# Patient Record
Sex: MALE | Race: WHITE | NOT HISPANIC OR LATINO | Employment: OTHER | URBAN - NONMETROPOLITAN AREA
[De-identification: names, ages, dates, MRNs, and addresses within clinical notes are randomized per-mention and may not be internally consistent; named-entity substitution may affect disease eponyms.]

---

## 2023-05-23 LAB
AMPHETAMINE (PRESENCE) IN URINE BY SCREEN METHOD: NORMAL
BARBITURATES PRESENCE IN URINE BY SCREEN METHOD: NORMAL
BENZODIAZEPINE (PRESENCE) IN URINE BY SCREEN METHOD: NORMAL
CANNABINOIDS IN URINE BY SCREEN METHOD: NORMAL
COCAINE (PRESENCE) IN URINE BY SCREEN METHOD: NORMAL
DRUG SCREEN COMMENT URINE: NORMAL
FENTANYL URINE: NORMAL
METHADONE (PRESENCE) IN URINE BY SCREEN METHOD: NORMAL
OPIATES (PRESENCE) IN URINE BY SCREEN METHOD: NORMAL
OXYCODONE (PRESENCE) IN URINE BY SCREEN METHOD: NORMAL
PHENCYCLIDINE (PRESENCE) IN URINE BY SCREEN METHOD: NORMAL

## 2023-11-04 DIAGNOSIS — J30.9 ALLERGIC RHINITIS, UNSPECIFIED: ICD-10-CM

## 2023-11-06 RX ORDER — LORATADINE 10 MG/1
10 TABLET ORAL DAILY
Qty: 90 TABLET | Refills: 1 | Status: SHIPPED | OUTPATIENT
Start: 2023-11-06 | End: 2024-05-30

## 2024-01-15 PROBLEM — J44.9 COPD (CHRONIC OBSTRUCTIVE PULMONARY DISEASE) (MULTI): Status: ACTIVE | Noted: 2024-01-15

## 2024-01-15 PROBLEM — I82.90 VENOUS THROMBOSIS: Status: ACTIVE | Noted: 2024-01-15

## 2024-01-15 RX ORDER — UMECLIDINIUM BROMIDE AND VILANTEROL TRIFENATATE 62.5; 25 UG/1; UG/1
POWDER RESPIRATORY (INHALATION)
COMMUNITY
Start: 2020-12-29 | End: 2024-01-29 | Stop reason: SDUPTHER

## 2024-01-15 RX ORDER — GABAPENTIN 300 MG/1
CAPSULE ORAL
COMMUNITY
Start: 2022-06-01

## 2024-01-15 RX ORDER — MELOXICAM 15 MG/1
1 TABLET ORAL DAILY
COMMUNITY
Start: 2013-01-09

## 2024-01-15 RX ORDER — ALBUTEROL SULFATE 90 UG/1
2 AEROSOL, METERED RESPIRATORY (INHALATION) EVERY 4 HOURS PRN
COMMUNITY
Start: 2020-08-17 | End: 2024-01-29 | Stop reason: SDUPTHER

## 2024-01-29 ENCOUNTER — OFFICE VISIT (OUTPATIENT)
Dept: PULMONOLOGY | Facility: CLINIC | Age: 84
End: 2024-01-29
Payer: MEDICARE

## 2024-01-29 VITALS
BODY MASS INDEX: 35.58 KG/M2 | DIASTOLIC BLOOD PRESSURE: 71 MMHG | HEART RATE: 71 BPM | WEIGHT: 248 LBS | SYSTOLIC BLOOD PRESSURE: 128 MMHG | OXYGEN SATURATION: 95 %

## 2024-01-29 DIAGNOSIS — G47.34 SLEEP RELATED HYPOXIA: ICD-10-CM

## 2024-01-29 DIAGNOSIS — G47.33 OSA (OBSTRUCTIVE SLEEP APNEA): ICD-10-CM

## 2024-01-29 DIAGNOSIS — J43.2 CENTRILOBULAR EMPHYSEMA (MULTI): Primary | ICD-10-CM

## 2024-01-29 PROCEDURE — 1036F TOBACCO NON-USER: CPT | Performed by: NURSE PRACTITIONER

## 2024-01-29 PROCEDURE — 1160F RVW MEDS BY RX/DR IN RCRD: CPT | Performed by: NURSE PRACTITIONER

## 2024-01-29 PROCEDURE — 1125F AMNT PAIN NOTED PAIN PRSNT: CPT | Performed by: NURSE PRACTITIONER

## 2024-01-29 PROCEDURE — 99214 OFFICE O/P EST MOD 30 MIN: CPT | Performed by: NURSE PRACTITIONER

## 2024-01-29 PROCEDURE — 1159F MED LIST DOCD IN RCRD: CPT | Performed by: NURSE PRACTITIONER

## 2024-01-29 RX ORDER — UMECLIDINIUM BROMIDE AND VILANTEROL TRIFENATATE 62.5; 25 UG/1; UG/1
1 POWDER RESPIRATORY (INHALATION) DAILY
Qty: 120 EACH | Refills: 2 | Status: SHIPPED | OUTPATIENT
Start: 2024-01-29

## 2024-01-29 RX ORDER — COLESEVELAM 180 1/1
625 TABLET ORAL
COMMUNITY
Start: 2024-01-03

## 2024-01-29 RX ORDER — APIXABAN 5 MG/1
TABLET, FILM COATED ORAL
COMMUNITY
Start: 2024-01-26

## 2024-01-29 RX ORDER — TRIAMCINOLONE ACETONIDE 1 MG/G
CREAM TOPICAL
COMMUNITY
Start: 2024-01-15

## 2024-01-29 RX ORDER — METOPROLOL TARTRATE 25 MG/1
25 TABLET, FILM COATED ORAL 2 TIMES DAILY
COMMUNITY
Start: 2024-01-22

## 2024-01-29 RX ORDER — LOSARTAN POTASSIUM 50 MG/1
TABLET ORAL
COMMUNITY
Start: 2024-01-21

## 2024-01-29 RX ORDER — ALBUTEROL SULFATE 90 UG/1
2 AEROSOL, METERED RESPIRATORY (INHALATION) EVERY 4 HOURS PRN
Qty: 18 G | Refills: 11 | Status: SHIPPED | OUTPATIENT
Start: 2024-01-29

## 2024-01-29 NOTE — PROGRESS NOTES
Subjective   Patient ID: Felipe Espinal is a 83 y.o. male who presents for No chief complaint on file..  HPI  s77-year-old  man with COPD and severe obstructive sleep apnea here for follow up. [employer] told the patient that he was noncompliant and had removed his CPAP machine. I did call [employer] and I did state his case with them that patient had a new sleep study in May 2018 and had been compliant since then for some reason they did not recognize this new sleep study and have deemed him noncompliant and removed his CPAP machine. Patient was very upset and did try to fight with them but unfortunately they still removed his machine. He had a new sleep study on 5/16/18 showing an AHI of 49.6 and was on CPAP of 14. He has noticed an increase in his shortness of breath over the winter. He does mow lawns during the summertime and I am concerned about him being more short of breath was the heat and humidity. We talked about changing him over to something like Breo but at this time he wants to stay on Stiolto, he likes anoro but insurance won't cover it.      02/17/2020: Since his last visit he went back to anoro but it is still expensive. he needs a generic medication, we can try generic advair, he c/o of postnasal drip. i talked to him today about getting him a CPAP machine, I have some in [place] I could set one and give it to him. he does not want a CPAP at this time. He tells me he is not tried and sleeps well. I asked him to think about it. He needs a new breathing test.     02/17/2021: He is here today for follow-up. Patient has been doing well uses his Anoro daily. He has not needed his rescue medication. Although he states he does not use it it sounds like there are times when he could be using it so I explained to him today times when he should be utilizing his ProAir. He tries to remain active. He has had both of his Covid vaccines. He was never able to obtain a CPAP so we talked  today about doing a nocturnal pulse oximetry study to make sure that his oxygen is staying up during the night. We will use [employer] [facility] for this. He needs refills today on his medication    04/12/2021: He is here today after having a nocturnal study, he does need oxygen at at 2L . he is using the 02 but complains that it is so loud it keeps him up and his wife is sleeping down the garza. [facility]. He got a new walker and [place] walking [place] the mall now. He notices that he gets short of breath with exertion but it has been improving since he started walking. He is also still tired during the day but we had a long talk about the difference between having sleep apnea needing oxygen. He understands that the oxygen does not fix the sleep apnea. We tried for 2 years to get him a CPAP machine but insurance would not cover it and kept denying it.    10/12/2021: He is here today for follow-up. He is complaining of a cough with yellow sputum, wheezing and increased shortness of breath for the last 3 weeks. He has no other symptoms and no Covid exposure that he is aware of. He continues to use his Anoro rarely uses his rescue inhaler although we did talk today about utilizing it especially with his increased shortness of breath and wheezing. He continues to use his oxygen at 2 L with sleep. He tells me has been very dry and his company is coming out to put a humidifier in today. Patient tries to remain active but arthritis really slows him down. I will give him referral today for Dr. Taylor office who can evaluate him for his arthritis.    04/12/2022: He is here today for follow-up. He has been doing well. His biggest complaint today is his arthritis. He is seeing rheumatology for this. He uses his Anoro regularly. Uses his Claritin since it is allergy season. He does use his oxygen at 2 L but was a little concerned because he in the morning when he checks his oxygen he notes  that it is at 90%. I will order him a nocturnal pulse oximetry study just to make sure 2 L is sufficient for him with sleep.      10/11/2022 he is here today for follow-up. Breathing wise he has been doing well. He is having a difficult time with the pain in his back. He is seeing pain management. He uses his Anoro regularly. Rarely uses his albuterol. He needs refills on his loratadine today. He had a nocturnal pulse oximetry study showing that he does need 2 L of oxygen with sleep. His pulse did go down to 32 during the night. I sent this over to his PCP and recommended that he follow-up with cardiology.    05/02/23 he is here today for follow-up. He did have a new sleep study at [facility] they started him again on CPAP which he failed. He then went and saw Dr. Matthews to discuss an oral appliance. According to patient he is a candidate he wanted to make sure that it was legitimate and today. I encouraged him to go back and see Dr. Matthews and have the oral appliance made. Once the oral appliance is in place we will have to discuss whether or not he still needs 2 L of oxygen at night. He continues to use Anoro with good response. He uses albuterol on occasion. 5 minutes spent preparing patient's chart. 20 minutes spent with patient answering questions and determining care. 10 minutes spent charting and ordering tests and medications     07/25/23 he is here today for follow-up. He is doing well. His only complaint is his arthritis. He follows up regularly with pain management. He had a new PFT his FEV1 is 99% he has mild COPD. He continues on Anoro. Uses his albuterol as needed. No complaints breathing wise. 5 minutes spent preparing patient's chart. 15 minutes spent with patient answering questions and determining care. 10 minutes spent charting and ordering tests and medications     1/29/24 he is here today for follow up. He brought his daughter in with him today. She is helping him with his health. He is not able to use  his cpap. He just had his oxygen concentrator serviced.  He will be using 2 L at night.  He has Anoro he does not always remember to use it.  I talked to him today about the importance of using a maintenance inhaler regularly.  He has albuterol at home but believes it is .    Review of Systems   All other systems reviewed and are negative.      Objective   Physical Exam  Vitals and nursing note reviewed.   Constitutional:       Appearance: Normal appearance.   HENT:      Head: Normocephalic.      Nose: Nose normal.   Cardiovascular:      Rate and Rhythm: Normal rate.   Pulmonary:      Effort: Pulmonary effort is normal.      Breath sounds: Normal breath sounds.   Neurological:      General: No focal deficit present.      Mental Status: He is alert. Mental status is at baseline.   Psychiatric:         Mood and Affect: Mood normal.         Behavior: Behavior normal.         Thought Content: Thought content normal.         Judgment: Judgment normal.         Assessment/Plan     #. Severe obstructive sleep apnea exacerbated in rem sleep, patient had his machine taken away by [facility], they d/eemed him non compliant. I had called to talk to them because he had a new study in May but they stated he was still found to be non compliant   - Will order a nocturnal pulse ox on room air   - is using 2L of oxgyen with sleep - but his pcp ordered another sleep study - he had failed cpap before   23 he did have a new sleep study and was restarted on CPAP through [facility] he tells me he failed it again. They sent him for consult for an oral appliance. He is going to pursue the oral appliance. We will need to check to see if he requires his oxygen with the oral appliance  24 He has not been able to use his CPAP but will use the oxygen     #. COPD, moderate by symptoms but mild by PFT, stable. No evidence of asthma. He has noticed some increased SOB with activity. He likes anoro but it is expensive, his insurance will only  cover generic meds   - we will try generic Advair, use Albuterol as needed..   - Order a new PFT   - Back to using anoro, insurance is covering it again    -He has been having increased shortness of breath, cough with yellow sputum and wheezing over for the last 3 weeks on his anoro today   05/02/23 continues on Anoro with good response. Rarely has to use his albuterol  07/25/23 he had a new PFT he has mild COPD. Continues on Anoro with good response.  1/29/24 He uses Anoro but not every day, reminded him the importance of daily use     # Hypoxia   - Nocturnal pulse ox shows he needs oxygen at night, he spent 35.7 mins at 88% or less    - He was started on 2 L oxygen with sleep    - uses his oxygen every night with sleep from [employer]   - he had a new nocturnal pulse ox study which showed that he spent 7 minutes below 89%. His pulse went down to 32 during the night. I sent this over to his PCP and recommended he see cardiology  07/25/23 he tells me tonight that he does not remember to use his oxygen every night. I encouraged him to use his O2 and keep it above 90%  1/24/29 He just had his 02 machine serviced. He will use it at night. He does not use his CPAP, he has never been able to get used to it     # Allergic rhinitis   - Continue Flonase and Claritin    #. Morbid obesity, contributing to obstructive sleep apnea   - Encouraged weight loss with diet and exercise which will help in the long term treatment of obstructive sleep apnea.    Mr. Espinal it was a pleasure seeing you in the office today. We discussed the following:      - Continue with your Anoro daily and ProAir as needed   - Please continue to use your oxygen at night    - Please continue to stay active, weight loss can help your breathing     Follow-up in 6 months            ZORAIDA Soernsen-CNP 01/29/24 9:22 AM

## 2024-01-29 NOTE — PATIENT INSTRUCTIONS
Due to patient being non-English speaking/uses sign language, an  was used for this visit. Only for face-to-face interpretation by an external agency, date and length of interpretation can be found on the scanned worksheet.     name: Honey Arce  Agency: [employer]  Language: Kittitian   Telephone number: [phone]  Type of interpretation: telephone, spoken     Mr. Espinal it was a pleasure seeing you in the office today. We discussed the following:      - Continue with your Anoro daily and ProAir as needed   - Please continue to use your oxygen at night    - Please continue to stay active, weight loss can help your breathing     Follow-up in 6 months

## 2024-05-29 DIAGNOSIS — J30.9 ALLERGIC RHINITIS, UNSPECIFIED: ICD-10-CM

## 2024-05-30 RX ORDER — LORATADINE 10 MG/1
10 TABLET ORAL DAILY
Qty: 90 TABLET | Refills: 3 | Status: SHIPPED | OUTPATIENT
Start: 2024-05-30

## 2024-08-16 ENCOUNTER — TELEPHONE (OUTPATIENT)
Dept: PULMONOLOGY | Facility: CLINIC | Age: 84
End: 2024-08-16
Payer: MEDICARE

## 2024-08-16 NOTE — TELEPHONE ENCOUNTER
Pts daughter called this morning and said that the patient isn't using his 02 and I wanting to discontinue it pt is ok doing a opox on room air to check his 02 levels.    Thank you!

## 2024-08-19 ENCOUNTER — APPOINTMENT (OUTPATIENT)
Dept: PULMONOLOGY | Facility: CLINIC | Age: 84
End: 2024-08-19
Payer: MEDICARE

## 2024-08-19 VITALS
BODY MASS INDEX: 32.54 KG/M2 | WEIGHT: 226.8 LBS | SYSTOLIC BLOOD PRESSURE: 109 MMHG | DIASTOLIC BLOOD PRESSURE: 67 MMHG | HEART RATE: 59 BPM | OXYGEN SATURATION: 96 %

## 2024-08-19 DIAGNOSIS — G47.34 SLEEP RELATED HYPOXIA: Primary | ICD-10-CM

## 2024-08-19 DIAGNOSIS — J43.2 CENTRILOBULAR EMPHYSEMA (MULTI): ICD-10-CM

## 2024-08-19 PROCEDURE — 1036F TOBACCO NON-USER: CPT | Performed by: NURSE PRACTITIONER

## 2024-08-19 PROCEDURE — 99215 OFFICE O/P EST HI 40 MIN: CPT | Performed by: NURSE PRACTITIONER

## 2024-08-19 PROCEDURE — 1159F MED LIST DOCD IN RCRD: CPT | Performed by: NURSE PRACTITIONER

## 2024-08-19 NOTE — TELEPHONE ENCOUNTER
I show he is on 02 with sleep. He would need a noc pulse  ox to see if he needs 02 with sleep so we can order it if he agrees thanks

## 2024-08-19 NOTE — PATIENT INSTRUCTIONS
You can follow-up with Dr. Winston for your albuterol refills if needed.  Otherwise your PCP may be able to do this for you

## 2024-08-19 NOTE — PROGRESS NOTES
Subjective   Patient ID: Felipe Espinal is a 83 y.o. male who presents for Follow-up (fu).  HPI  s77-year-old  man with COPD and severe obstructive sleep apnea here for follow up. [employer] [facility] told the patient that he was noncompliant and had removed his CPAP machine. I did call [employer] and I did state his case with them that patient had a new sleep study in May 2018 and had been compliant since then for some reason they did not recognize this new sleep study and have deemed him noncompliant and removed his CPAP machine. Patient was very upset and did try to fight with them but unfortunately they still removed his machine. He had a new sleep study on 5/16/18 showing an AHI of 49.6 and was on CPAP of 14. He has noticed an increase in his shortness of breath over the winter. He does mow lawns during the summertime and I am concerned about him being more short of breath was the heat and humidity. We talked about changing him over to something like Breo but at this time he wants to stay on Stiolto, he likes anoro but insurance won't cover it.      02/17/2020: Since his last visit he went back to anoro but it is still expensive. he needs a generic medication, we can try generic advair, he c/o of postnasal drip. i talked to him today about getting him a CPAP machine, I have some in [place] I could set one and give it to him. he does not want a CPAP at this time. He tells me he is not tried and sleeps well. I asked him to think about it. He needs a new breathing test.     02/17/2021: He is here today for follow-up. Patient has been doing well uses his Anoro daily. He has not needed his rescue medication. Although he states he does not use it it sounds like there are times when he could be using it so I explained to him today times when he should be utilizing his ProAir. He tries to remain active. He has had both of his Covid vaccines. He was never able to obtain a CPAP so we talked today about  doing a nocturnal pulse oximetry study to make sure that his oxygen is staying up during the night. We will use [employer] for this. He needs refills today on his medication    04/12/2021: He is here today after having a nocturnal study, he does need oxygen at at 2L . he is using the 02 but complains that it is so loud it keeps him up and his wife is sleeping down the garza. [facility]. He got a new walker and [place] walking [place] the mall now. He notices that he gets short of breath with exertion but it has been improving since he started walking. He is also still tired during the day but we had a long talk about the difference between having sleep apnea needing oxygen. He understands that the oxygen does not fix the sleep apnea. We tried for 2 years to get him a CPAP machine but insurance would not cover it and kept denying it.    10/12/2021: He is here today for follow-up. He is complaining of a cough with yellow sputum, wheezing and increased shortness of breath for the last 3 weeks. He has no other symptoms and no Covid exposure that he is aware of. He continues to use his Anoro rarely uses his rescue inhaler although we did talk today about utilizing it especially with his increased shortness of breath and wheezing. He continues to use his oxygen at 2 L with sleep. He tells me has been very dry and his company is coming out to put a humidifier in today. Patient tries to remain active but arthritis really slows him down. I will give him referral today for Dr. Taylor office who can evaluate him for his arthritis.    04/12/2022: He is here today for follow-up. He has been doing well. His biggest complaint today is his arthritis. He is seeing rheumatology for this. He uses his Anoro regularly. Uses his Claritin since it is allergy season. He does use his oxygen at 2 L but was a little concerned because he in the morning when he checks his oxygen he notes that it is at  90%. I will order him a nocturnal pulse oximetry study just to make sure 2 L is sufficient for him with sleep.      10/11/2022 he is here today for follow-up. Breathing wise he has been doing well. He is having a difficult time with the pain in his back. He is seeing pain management. He uses his Anoro regularly. Rarely uses his albuterol. He needs refills on his loratadine today. He had a nocturnal pulse oximetry study showing that he does need 2 L of oxygen with sleep. His pulse did go down to 32 during the night. I sent this over to his PCP and recommended that he follow-up with cardiology.    05/02/23 he is here today for follow-up. He did have a new sleep study at [facility] they started him again on CPAP which he failed. He then went and saw Dr. Matthews to discuss an oral appliance. According to patient he is a candidate he wanted to make sure that it was legitimate and today. I encouraged him to go back and see Dr. Matthews and have the oral appliance made. Once the oral appliance is in place we will have to discuss whether or not he still needs 2 L of oxygen at night. He continues to use Anoro with good response. He uses albuterol on occasion. 5 minutes spent preparing patient's chart. 20 minutes spent with patient answering questions and determining care. 10 minutes spent charting and ordering tests and medications     07/25/23 he is here today for follow-up. He is doing well. His only complaint is his arthritis. He follows up regularly with pain management. He had a new PFT his FEV1 is 99% he has mild COPD. He continues on Anoro. Uses his albuterol as needed. No complaints breathing wise. 5 minutes spent preparing patient's chart. 15 minutes spent with patient answering questions and determining care. 10 minutes spent charting and ordering tests and medications     1/29/24 he is here today for follow up. He brought his daughter in with him today. She is helping him with his health. He is not able to use his cpap. He  just had his oxygen concentrator serviced.  He will be using 2 L at night.  He has Anoro he does not always remember to use it.  I talked to him today about the importance of using a maintenance inhaler regularly.  He has albuterol at home but believes it is .    24 he is here today for follow-up.  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  And they have worsened significantly since I saw him last.  He is no longer using Anoro but has been an active.  He does have albuterol for rescue.  He is not using his oxygen as he is having difficulty keeping it on.  I will order a nocturnal pulse oximetry study to see if we can DC the O2    Review of Systems   All other systems reviewed and are negative.      Objective   Physical Exam  Vitals and nursing note reviewed.   Constitutional:       Appearance: Normal appearance.   HENT:      Head: Normocephalic.      Nose: Nose normal.   Cardiovascular:      Rate and Rhythm: Normal rate.   Pulmonary:      Effort: Pulmonary effort is normal.      Breath sounds: Normal breath sounds.   Neurological:      General: No focal deficit present.      Mental Status: He is alert. Mental status is at baseline.   Psychiatric:         Mood and Affect: Mood normal.         Behavior: Behavior normal.         Thought Content: Thought content normal.         Judgment: Judgment normal.         Assessment/Plan     #. Severe obstructive sleep apnea exacerbated in rem sleep, patient had his machine taken away by [facility], they d/eemed him non compliant. I had called to talk to them because he had a new study in May but they stated he was still found to be non compliant   - Will order a nocturnal pulse ox on room air   - is using 2L of oxgyen with sleep - but his pcp ordered another sleep study - he had failed cpap before   23 he did have a new sleep study and was restarted on CPAP through [facility] he tells me he failed it again. They sent him for consult for  an oral appliance. He is going to pursue the oral appliance. We will need to check to see if he requires his oxygen with the oral appliance  1/29/24 He has not been able to use his CPAP but will use the oxygen   08/19/24 because of the tremors he is not able to keep his oxygen on at night.  He would like a nocturnal pulse oximetry study to see if he still requires the O2    #. COPD, moderate by symptoms but mild by PFT, stable. No evidence of asthma. He has noticed some increased SOB with activity. He likes anoro but it is expensive, his insurance will only cover generic meds   - we will try generic Advair, use Albuterol as needed..   - Order a new PFT   - Back to using anoro, insurance is covering it again    -He has been having increased shortness of breath, cough with yellow sputum and wheezing over for the last 3 weeks on his anoro today   05/02/23 continues on Anoro with good response. Rarely has to use his albuterol  07/25/23 he had a new PFT he has mild COPD. Continues on Anoro with good response.  1/29/24 He uses Anoro but not every day, reminded him the importance of daily use   08/19/24 he is no longer using the Anoro.  He is become very and active so that he does not have any shortness of breath because of the Parkinson's-like tremors.  He will use albuterol as needed    # Hypoxia   - Nocturnal pulse ox shows he needs oxygen at night, he spent 35.7 mins at 88% or less    - He was started on 2 L oxygen with sleep    - uses his oxygen every night with sleep from [facility]   - he had a new nocturnal pulse ox study which showed that he spent 7 minutes below 89%. His pulse went down to 32 during the night. I sent this over to his PCP and recommended he see cardiology  07/25/23 he tells me tonight that he does not remember to use his oxygen every night. I encouraged him to use his O2 and keep it above 90%  1/24/29 He just had his 02 machine serviced. He will use it at night. He does not use his CPAP, he has never  been able to get used to it     # Allergic rhinitis   - Continue Flonase and Claritin    #. Morbid obesity, contributing to obstructive sleep apnea   - Encouraged weight loss with diet and exercise which will help in the long term treatment of obstructive sleep apnea.    Mr. Espinal it was a pleasure seeing you in the office today. We discussed the following:      - Continue with your Anoro daily and ProAir as needed   - Please continue to use your oxygen at night    - Please continue to stay active, weight loss can help your breathing     Follow-up in 6 months      You can follow-up with Dr. Winston for your albuterol refills if needed.  Otherwise your PCP may be able to do this for you         ZORAIDA Sorensen-CNP 08/19/24 11:33 AM

## 2024-08-29 DIAGNOSIS — G47.34 SLEEP RELATED HYPOXIA: Primary | ICD-10-CM

## 2024-09-04 ENCOUNTER — TELEPHONE (OUTPATIENT)
Dept: PULMONOLOGY | Facility: CLINIC | Age: 84
End: 2024-09-04
Payer: MEDICARE

## 2024-09-04 NOTE — TELEPHONE ENCOUNTER
Pts daughter called asking for the results of pts Overnight Pulse Oximetry  and doesn't  know why there was an order placed for someone to come out and check on cpap when her father doesn't use it .Thank You .       Pts daughter called again this morning about the test results of the overnight pulse ox please call to go over the results with pts daughter at [phone] [place] and she is on HIPAA form

## 2024-09-04 NOTE — TELEPHONE ENCOUNTER
He needs to continue his oxygen with sleep. He was below 89% for 40 minutes. We did not put an order in for his cpap, we put the order in for them to come out and service/clean/change the filters for his home oxygen concentrator. Thanks

## 2024-09-16 ENCOUNTER — TELEPHONE (OUTPATIENT)
Dept: PULMONOLOGY | Facility: CLINIC | Age: 84
End: 2024-09-16
Payer: MEDICARE

## 2024-09-16 NOTE — TELEPHONE ENCOUNTER
Her dads test shows he needs oxygen at night. He has oxygen at home to use. I can't discontinue it as it is medically necessary. If they want to refuse the 02 and send it back that is up to them.

## 2024-09-16 NOTE — TELEPHONE ENCOUNTER
Pts daughter called and she is needing you to call her about her dads opox results she is on her HIPAA form Ramakrishna is the daughters name she can be reached at [phone]. Pts mom has dementia and that is why ramakrishna needs to be contacted     Thank you!

## 2025-01-09 ENCOUNTER — TELEPHONE (OUTPATIENT)
Dept: PULMONOLOGY | Facility: CLINIC | Age: 85
End: 2025-01-09
Payer: MEDICARE

## 2025-01-09 NOTE — TELEPHONE ENCOUNTER
Pts daughter called and said that her dad is now in assisted living and doesn't use his 02 and they don't provide him oxygen either she is wanting to know if they can disc the 02 and have [employer]  equipment.    Thank you!